# Patient Record
Sex: FEMALE | Race: WHITE
[De-identification: names, ages, dates, MRNs, and addresses within clinical notes are randomized per-mention and may not be internally consistent; named-entity substitution may affect disease eponyms.]

---

## 2020-12-23 ENCOUNTER — HOSPITAL ENCOUNTER (EMERGENCY)
Dept: HOSPITAL 8 - ED | Age: 25
Discharge: HOME | End: 2020-12-23
Payer: MEDICAID

## 2020-12-23 VITALS — BODY MASS INDEX: 19.41 KG/M2 | HEIGHT: 63 IN | WEIGHT: 109.57 LBS

## 2020-12-23 VITALS — DIASTOLIC BLOOD PRESSURE: 74 MMHG | SYSTOLIC BLOOD PRESSURE: 124 MMHG

## 2020-12-23 DIAGNOSIS — R10.2: ICD-10-CM

## 2020-12-23 DIAGNOSIS — Z3A.08: ICD-10-CM

## 2020-12-23 DIAGNOSIS — O26.891: Primary | ICD-10-CM

## 2020-12-23 LAB
ALBUMIN SERPL-MCNC: 4.3 G/DL (ref 3.4–5)
ANION GAP SERPL CALC-SCNC: 5 MMOL/L (ref 5–15)
BASOPHILS # BLD AUTO: 0.1 X10^3/UL (ref 0–0.1)
BASOPHILS NFR BLD AUTO: 1 % (ref 0–1)
CALCIUM SERPL-MCNC: 9.2 MG/DL (ref 8.5–10.1)
CHLORIDE SERPL-SCNC: 111 MMOL/L (ref 98–107)
CREAT SERPL-MCNC: 0.8 MG/DL (ref 0.55–1.02)
EOSINOPHIL # BLD AUTO: 0.1 X10^3/UL (ref 0–0.4)
EOSINOPHIL NFR BLD AUTO: 1 % (ref 1–7)
ERYTHROCYTE [DISTWIDTH] IN BLOOD BY AUTOMATED COUNT: 13.3 % (ref 9.6–15.2)
LYMPHOCYTES # BLD AUTO: 3 X10^3/UL (ref 1–3.4)
LYMPHOCYTES NFR BLD AUTO: 34 % (ref 22–44)
MCH RBC QN AUTO: 29.8 PG (ref 27–34.8)
MCHC RBC AUTO-ENTMCNC: 33.6 G/DL (ref 32.4–35.8)
MD: NO
MICROSCOPIC: (no result)
MONOCYTES # BLD AUTO: 0.5 X10^3/UL (ref 0.2–0.8)
MONOCYTES NFR BLD AUTO: 6 % (ref 2–9)
NEUTROPHILS # BLD AUTO: 5.2 X10^3/UL (ref 1.8–6.8)
NEUTROPHILS NFR BLD AUTO: 59 % (ref 42–75)
PLATELET # BLD AUTO: 234 X10^3/UL (ref 130–400)
PMV BLD AUTO: 10.6 FL (ref 7.4–10.4)
RBC # BLD AUTO: 5.39 X10^6/UL (ref 3.82–5.3)

## 2020-12-23 PROCEDURE — 76801 OB US < 14 WKS SINGLE FETUS: CPT

## 2020-12-23 PROCEDURE — 80048 BASIC METABOLIC PNL TOTAL CA: CPT

## 2020-12-23 PROCEDURE — 99284 EMERGENCY DEPT VISIT MOD MDM: CPT

## 2020-12-23 PROCEDURE — 85025 COMPLETE CBC W/AUTO DIFF WBC: CPT

## 2020-12-23 PROCEDURE — 84702 CHORIONIC GONADOTROPIN TEST: CPT

## 2020-12-23 PROCEDURE — 82040 ASSAY OF SERUM ALBUMIN: CPT

## 2020-12-23 PROCEDURE — 87086 URINE CULTURE/COLONY COUNT: CPT

## 2020-12-23 PROCEDURE — 36415 COLL VENOUS BLD VENIPUNCTURE: CPT

## 2020-12-23 PROCEDURE — 81001 URINALYSIS AUTO W/SCOPE: CPT

## 2020-12-26 ENCOUNTER — HOSPITAL ENCOUNTER (EMERGENCY)
Dept: HOSPITAL 8 - ED | Age: 25
Discharge: HOME | End: 2020-12-26
Payer: MEDICAID

## 2020-12-26 VITALS — WEIGHT: 108.69 LBS | HEIGHT: 63 IN | BODY MASS INDEX: 19.26 KG/M2

## 2020-12-26 VITALS — DIASTOLIC BLOOD PRESSURE: 80 MMHG | SYSTOLIC BLOOD PRESSURE: 116 MMHG

## 2020-12-26 DIAGNOSIS — F17.200: ICD-10-CM

## 2020-12-26 DIAGNOSIS — Z32.01: Primary | ICD-10-CM

## 2020-12-26 PROCEDURE — 99284 EMERGENCY DEPT VISIT MOD MDM: CPT

## 2020-12-26 PROCEDURE — 84702 CHORIONIC GONADOTROPIN TEST: CPT

## 2020-12-26 PROCEDURE — 36415 COLL VENOUS BLD VENIPUNCTURE: CPT

## 2020-12-26 PROCEDURE — 76801 OB US < 14 WKS SINGLE FETUS: CPT

## 2020-12-26 NOTE — NUR
PT UPRIGHT ON GURNEY AWAKE & CCOMFORTABLE, RESPONDS APPROP TO STAFF, NAD/DENIES 
PAIN, COMFORT MEASURES PROVIDED, CALL LIGHT WITHIN REACH.

## 2021-01-13 ENCOUNTER — HOSPITAL ENCOUNTER (EMERGENCY)
Dept: HOSPITAL 8 - ED | Age: 26
Discharge: HOME | End: 2021-01-13
Payer: MEDICAID

## 2021-01-13 VITALS — SYSTOLIC BLOOD PRESSURE: 128 MMHG | DIASTOLIC BLOOD PRESSURE: 61 MMHG

## 2021-01-13 VITALS — BODY MASS INDEX: 18.22 KG/M2 | WEIGHT: 106.7 LBS | HEIGHT: 64 IN

## 2021-01-13 DIAGNOSIS — O21.0: Primary | ICD-10-CM

## 2021-01-13 DIAGNOSIS — R42: ICD-10-CM

## 2021-01-13 DIAGNOSIS — Z3A.21: ICD-10-CM

## 2021-01-13 LAB
ALBUMIN SERPL-MCNC: 4 G/DL (ref 3.4–5)
ANION GAP SERPL CALC-SCNC: 8 MMOL/L (ref 5–15)
BASOPHILS # BLD AUTO: 0.1 X10^3/UL (ref 0–0.1)
BASOPHILS NFR BLD AUTO: 0 % (ref 0–1)
CALCIUM SERPL-MCNC: 9.5 MG/DL (ref 8.5–10.1)
CHLORIDE SERPL-SCNC: 109 MMOL/L (ref 98–107)
CREAT SERPL-MCNC: 0.56 MG/DL (ref 0.55–1.02)
EOSINOPHIL # BLD AUTO: 0.1 X10^3/UL (ref 0–0.4)
EOSINOPHIL NFR BLD AUTO: 1 % (ref 1–7)
ERYTHROCYTE [DISTWIDTH] IN BLOOD BY AUTOMATED COUNT: 13.3 % (ref 9.6–15.2)
LYMPHOCYTES # BLD AUTO: 2.5 X10^3/UL (ref 1–3.4)
LYMPHOCYTES NFR BLD AUTO: 16 % (ref 22–44)
MCH RBC QN AUTO: 29.5 PG (ref 27–34.8)
MCHC RBC AUTO-ENTMCNC: 33.7 G/DL (ref 32.4–35.8)
MD: NO
MICROSCOPIC: (no result)
MONOCYTES # BLD AUTO: 0.7 X10^3/UL (ref 0.2–0.8)
MONOCYTES NFR BLD AUTO: 5 % (ref 2–9)
NEUTROPHILS # BLD AUTO: 12.3 X10^3/UL (ref 1.8–6.8)
NEUTROPHILS NFR BLD AUTO: 79 % (ref 42–75)
PLATELET # BLD AUTO: 209 X10^3/UL (ref 130–400)
PMV BLD AUTO: 10.2 FL (ref 7.4–10.4)
RBC # BLD AUTO: 5.21 X10^6/UL (ref 3.82–5.3)

## 2021-01-13 PROCEDURE — 99284 EMERGENCY DEPT VISIT MOD MDM: CPT

## 2021-01-13 PROCEDURE — 96375 TX/PRO/DX INJ NEW DRUG ADDON: CPT

## 2021-01-13 PROCEDURE — 36415 COLL VENOUS BLD VENIPUNCTURE: CPT

## 2021-01-13 PROCEDURE — 81003 URINALYSIS AUTO W/O SCOPE: CPT

## 2021-01-13 PROCEDURE — 96361 HYDRATE IV INFUSION ADD-ON: CPT

## 2021-01-13 PROCEDURE — 80048 BASIC METABOLIC PNL TOTAL CA: CPT

## 2021-01-13 PROCEDURE — 96374 THER/PROPH/DIAG INJ IV PUSH: CPT

## 2021-01-13 PROCEDURE — 82040 ASSAY OF SERUM ALBUMIN: CPT

## 2021-01-13 PROCEDURE — 93005 ELECTROCARDIOGRAM TRACING: CPT

## 2021-01-13 PROCEDURE — 85025 COMPLETE CBC W/AUTO DIFF WBC: CPT

## 2021-01-13 NOTE — NUR
IV PLACED, PT MEDICATED PER ERP ORDER FOR NAUSEA, DRY HEAVING.  NS BOLUS 
INFUSING. URINE COLLECTED/SENT TO LAB.

## 2021-01-13 NOTE — NUR
NO VOMITING DURING ED STAY. PT STATES DECREASED DIZZINESS, ABLE TO KEEP WATER 
DOWN. PT DISCHARGED, AMBULATORY W/STEADY GAIT TO DC DESK.

## 2021-01-13 NOTE — NUR
PT STATING IV HURTING, 1ST LITER NS COMPLETED, DECLINES 2ND LITER NS OFFERED.  
PO CHALLENGE STARTED.  CALL LIGHT WITHIN REACH.

## 2021-01-13 NOTE — NUR
PT PLACED ON ALL ROOM MONITORING.  EKG COMPLETED IN TRIAGE, NSR ON MONITOR  PT 
STATES TOO DIZZY TO BE ABLE TO AMBULATE SAFELY.  BS COMMODE PLACED IN ROOM, PT 
AWARE OF NEED FOR UA.  PT STATES N/V, UNABLE TO KEEP FOOD OR WATER DOWN FOR TWO 
DAYS.  CALL LIGHT WITHIN REACH, SPOUSE AT BS.